# Patient Record
Sex: FEMALE | Race: WHITE | Employment: OTHER | URBAN - METROPOLITAN AREA
[De-identification: names, ages, dates, MRNs, and addresses within clinical notes are randomized per-mention and may not be internally consistent; named-entity substitution may affect disease eponyms.]

---

## 2019-03-20 NOTE — ED PROVIDER NOTES
Patient Seen in: [facility] AND [facility] Emergency Department    History   CC: nose injury  HPI: Chano Shayon 59year old female  who presents to the ER c/o nasal pain s/p injury today in which pt was cleaning her dogs paws and the dog turned her head quic Lung sounds clear bilaterally and wob unlabored, good aeration with equal, even expansion bilaterally   CV - RRR  Skin - no rashes or petechiae noted, pink warm and dry throughout, mmm, cap refill <2seconds  Neuro - A&O x4, 2+ reflexes bilat.  biceps, claudine Prescribed:  There are no discharge medications for this patient.

## 2019-09-01 NOTE — ED INITIAL ASSESSMENT (HPI)
Patient cut her right hand on some nails doing some work this morning. Has 2 small lacs and is on Plavix.  Tetanus UTD, bleeding controlled

## 2019-09-01 NOTE — ED PROVIDER NOTES
Patient Seen in: [facility] AND [facility] Emergency Department    History   Patient presents with:  Upper Extremity Injury (musculoskeletal)    Stated Complaint: right hand injury s/p fall    HPI    22-year-old female with history of seizures, here after a fall above.    Physical Exam     ED Triage Vitals [09/01/19 0919]   /54   Pulse 68   Resp 18   Temp 98 °F (36.7 °C)   Temp src    SpO2 100 %   O2 Device None (Room air)       Current:/54   Pulse 68   Temp 98 °F (36.7 °C)   Resp 18   SpO2 100% ED:  Xr Hand (min 3 Views), Right (cpt=73130)    Result Date: 9/1/2019  CONCLUSION:  1. No acute fracture or subluxation. 2. 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 Other milder degenerative changes in the hand.     Dictated by (CST): Paulette Gonsalez MD on [phone] [id number]  [phone]    Schedule an appointment as soon as possible for a visit in 2 days  As needed    We recommend that you schedule follow up care with a primary care provider within the next three months to obtain basic health screening including reasse

## 2019-09-25 NOTE — ED PROVIDER NOTES
Patient Seen in: [facility] AND [facility] Emergency Department      History   Patient presents with:  Bite Sting,Insect (integumentary)    Stated Complaint: bee sting    HPI    51-year-old female with history of hymenoptera allergy and severe reactions who stat rate, regular rhythm and intact distal pulses. Pulmonary/Chest: Effort normal. No respiratory distress. Clear and equal breath sounds noted. Abdominal: Soft. There is no tenderness. There is no guarding. Musculoskeletal: Normal range of motion.  No e diagnosis)    Disposition:  Discharge  9/25/2019  1:01 pm    Follow-up:  Smooth Chin MD  [address]  [phone]    Schedule an appointment as soon as possible for a visit in 1 day      We recommend that you s

## 2019-09-25 NOTE — ED NOTES
Patient presents to ER after being stung by a wasp to left wrist. Patient speaking in full sentences. No respiratory distress noted at this time. Patient states the itching is resolving. Managing oral secretions. Patient following commands appropriately.  A

## 2019-09-25 NOTE — ED NOTES
Patient presents to ER after wasp sting. Patient unsteady on feet when transferred  to cart. Patient c/o itching to back, stomach and left wrist where she was stung. Managing oral secretions. No respiratory distress noted.  Patient is able to speak in full

## 2019-09-25 NOTE — ED INITIAL ASSESSMENT (HPI)
Stung by bee to left wrist. Allergic to bee stings. Confused, fell walking in the door to the er. No obvious signs of injury noted. No loc. No head injury. State she had a seizure while she drover her car to er. Tongue swelling noted.

## 2021-10-10 NOTE — ED INITIAL ASSESSMENT (HPI)
Patient c/o bee sting to her right 4th finger with redness and swelling since yesterday. Also having left sided rash and was prescribed valtrex 9/28-feels the bee sting \"activated\" the shingles. Also having shortness of breath, with \"some angina. \" Vishal Faustin

## 2021-10-11 NOTE — ED PROVIDER NOTES
No chief complaint on file. Stated Complaint: bee sting, shingles pain, \"angina\" sob    HPI  Patient complains increased swelling and redness to r 4th digit after getting stung by bee yesterday. No cp or sob. Co itching.   No loss of sensation    Pas Course   Labs Reviewed - No data to display  EKG    Rate, intervals and axes as noted on EKG Report.   Rate: 62  Rhythm: Sinus Rhythm  Reading:Normal intervals, normal EKG             MDM                     Disposition and Plan     Clinical Impression:  Be

## 2021-11-07 NOTE — ED QUICK NOTES
Pt states thinks she had a breakthough seizure last night while in bed. States has swelling, bruising and pain to lt elbow. States she thinks she hit on either bedside table or head board. Denies hitting head.

## 2021-11-07 NOTE — ED INITIAL ASSESSMENT (HPI)
Patient complains of left elbow pain radiating down to wrist s/p fall from seizure 2 days ago. Denies head injury from fall, a/o x 4, answering questions appropriately and following commands.

## 2021-11-07 NOTE — ED PROVIDER NOTES
Patient Seen in: [facility] AND [facility] Emergency Department      History   Patient presents with:  Arm or Hand Injury    Stated Complaint: left arm injury    Subjective:   HPI    77year old female with h/o seizure d/o who has left elbow pain.  Pt states she atraumatic. Eyes:      Conjunctiva/sclera: Conjunctivae normal.      Pupils: Pupils are equal, round, and reactive to light. Cardiovascular:      Rate and Rhythm: Normal rate.    Pulmonary:      Effort: Pulmonary effort is normal. No respiratory distres screening including reassessment of your blood pressure.       Medications Prescribed:  Current Discharge Medication List

## 2022-01-22 PROBLEM — R07.9 CHEST PAIN WITH MODERATE RISK FOR CARDIAC ETIOLOGY: Status: ACTIVE | Noted: 2022-01-22

## 2022-01-22 NOTE — ED QUICK NOTES
Orders for admission, patient is aware of plan and ready to go upstairs. Any questions, please call ED DUANE Lopez at extension [phone].      Patient Covid vaccination status: Unvaccinated     COVID Test Ordered in ED: Rapid SARS-CoV-2 by PCR    COVID Suspicion a
no

## 2022-01-22 NOTE — ED INITIAL ASSESSMENT (HPI)
Sudden onset mid sternal CP starting as epigastric pain while shopping today. Pain now resolved. On Plavix s/p TIA in 1999.

## 2022-01-22 NOTE — ED PROVIDER NOTES
Patient Seen in: [facility] AND [facility] Emergency Department      History   Patient presents with:  Chest Pain    Stated Complaint: cp     Subjective:   HPI  66-year-old female with high cholesterol, seizure disorder, history of lung mass status post resectio moist.      Pharynx: Oropharynx is clear. Eyes:      Extraocular Movements: Extraocular movements intact. Pupils: Pupils are equal, round, and reactive to light. Cardiovascular:      Rate and Rhythm: Normal rate and regular rhythm.       Pulses: ---------                               -----------         ------                     CBC W/ DIFFERENTIAL[[id number]]                              Final result                 Please view results for these tests on the individual orders.    CBC W/ DIFFERE 1/22/2022 Unknown

## 2022-01-23 NOTE — H&P
[place] Patient Status:  Inpatient    1955 MRN [id number]   Location [facility] 3W/SW Attending Mayra Dotson MD   Hosp Day # 0 PCP None Pcp     Date:  2022  Date of Admi UNKNOWN  Tegretol [Carbamaze*    UNKNOWN  Tramadol                UNKNOWN  Trileptal [Oxcarbaz*    UNKNOWN  Penicillins             RASH  topiramate 200 MG Oral Tab, Take 200 mg by mouth 2 (two) times daily.  200mg in the morning and 400mg at night  clopi cardiopulmonary abnormality.     Dictated by (CST): Sejal Koenig MD on 1/22/2022 at 4:30 PM     Finalized by (CST): Sejal Koenig MD on 1/22/2022 at 4:32 PM          EKG 12 Lead    Result Date: 1/22/2022  ECG Report  Interpretation  -------------------------

## 2022-01-23 NOTE — PROGRESS NOTES
[facility]    Progress Note    Quinn Hughes Patient Status:  Inpatient    1955 MRN [id number]   Location [facility] 3W/SW Attending Ellyn Velasquez MD   Hosp Day # 1 PCP None Pcp       SUBJECTIVE:  Denies chest pain. PTT 31.4  --   --    INR 0.93  --   --    DDIMER  --  0.31  --    GLU 92  --  87       Imaging:    Meds:   potassium chloride (K-DUR M20) CR tab 40 mEq, 40 mEq, Oral, Q4H  clopidogrel (PLAVIX) tab 75 mg, 75 mg, Oral, Daily  clonazePAM (KLONOPIN) tab 0.5

## 2022-01-23 NOTE — PROGRESS NOTES
[facility] Pharmacy Note:  Renal Dose Adjustment    Jennifer Stokes has been prescribed famotidine (PEPCID) 40 mg orally every 24 hours.     Crcl is 47.7 ml/min    Calculated creatinine clearance is < 50 ml/min, therefore the dose of famotidine (Pepcid) has been

## 2022-01-24 NOTE — PLAN OF CARE
Stress test and 2D Echo today. Results pending. Home today if cleared by cardiology.      Problem: CARDIOVASCULAR - ADULT  Goal: Maintains optimal cardiac output and hemodynamic stability  Description: INTERVENTIONS:  - Monitor vital signs, rhythm, and tren

## 2022-01-24 NOTE — PLAN OF CARE
Pt is A&Ox4. Pt on RA, sats stable. Pt voids freely. Pt is up ad roe independently. When I went to give 0600 meds this AM, pt stated, \"I take my Vimpat, Plavix, and Topamax at 6 AM everyday\". Vimpat and Plavix were scheduled for 0600.  Topamax is schedule chest pain    Interventions:   - monitor for chest pain  - monitor vital signs  - prepare patient for testing  - See additional Care Plan goals for specific interventions  Outcome: Progressing     Problem: CARDIOVASCULAR - ADULT  Goal: Maintains optimal ca

## 2022-01-24 NOTE — CONSULTS
[facility]    Report of Consultation    [address] Patient Status:  Inpatient    1955 MRN [id number]   Location [facility] 3W/SW Attending Jacklyn Babin MD   [facility] Day # 2 PCP None Pcp     Date of Admission:   surgical history. Family History  No family history on file.     Social History  Social History    Tobacco Use      Smoking status: Never Smoker      Smokeless tobacco: Never Used    Alcohol use: Never    Drug use: Never          Current Medications:  mu UNKNOWN  Tegretol [Carbamaze*    UNKNOWN  Tramadol                UNKNOWN  Trileptal [Oxcarbaz*    UNKNOWN  Penicillins             RASH    Review of Systems:   12 point review of system was completed. Pertinent positive and negative findings per HPI. 01/22/2022    .0 01/22/2022    CREATSERUM 0.50 (L) 01/23/2022    BUN 8 01/23/2022     01/23/2022    K 4.2 01/23/2022     (H) 01/23/2022    CO2 22.0 01/23/2022    GLU 87 01/23/2022    CA 8.2 (L) 01/23/2022    PTT 31.4 01/22/2022    INR 0. may occur. When identified these errors have been corrected.  While every attempt is made to correct errors during dictation discrepancies may still exist.

## 2022-01-24 NOTE — PLAN OF CARE
Problem: Patient Centered Care  Goal: Patient preferences are identified and integrated in the patient's plan of care  Description: Interventions:  - What would you like us to know as we care for you?  Pt home alone  - Provide timely, complete, and accura

## 2022-01-24 NOTE — DIETARY NOTE
Brief Nutrition Note    Pt seen by RD due to low BMI of 16.57. Noted 13# (13%) wt loss in 2 months. Pt reports she normally eats 1 meal/day at home. Has only had 1 meal since admission (2 days) due to diarrhea? No diarrhea noted in chart.  Noted pt takes da

## 2022-01-24 NOTE — PROGRESS NOTES
[facility]    Progress Note    Joshua Nascimento Patient Status:  Inpatient    1955 MRN [id number]   Location [facility] 3W/SW Attending Nicyk Quevedo MD   Hosp Day # 2 PCP None Pcp       SUBJECTIVE:  Denies chest pain. Nightly  famotidine (PEPCID) tab 20 mg, 20 mg, Oral, Daily  lacosamide (VIMPAT) tab 200 mg, 200 mg, Oral, Daily  topiramate (TOPAMAX) tab, 400 mg, Oral, Nightly  topiramate (TOPAMAX) tab, 200 mg, Oral, Daily  lacosamide (VIMPAT) tab 100 mg, 100 mg, Oral, D

## 2022-01-25 NOTE — DISCHARGE SUMMARY
[facility]    PATIENT'S NAME: Sarbjit Godoy JIN   ATTENDING PHYSICIAN: Torito Ratliff MD   PATIENT ACCOUNT#:   [id number]    LOCATION:  [address] [place] [address] #:   [id number]       YOB: 1955  ADMISSION DATE:       01/22/2

## 2022-01-25 NOTE — PAYOR COMM NOTE
--------------  STATUS CHANGED TO OBSERVATION.   DC 1/24      ADMISSION REVIEW     Payor: [employer]ALE[employer] PPO  Subscriber #:  [id number]  Authorization Number: [id number]    Admit date: 1/22/22  Admit time:  6:59 PM     Patient Seen in: [facility] Normal rate and regular rhythm. Pulses:           Carotid pulses are 2+ on the right side and 2+ on the left side. Radial pulses are 2+ on the right side. Heart sounds: Normal heart sounds.    Pulmonary:      Effort: Pulmonary effort is norm will be obtained. Given her age, risk factors and lack of recent cardiac evaluation with EKG findings, she will be admitted to the hospital for further management. Case discussed with Dr. Meryle Singer for admission and Dr. Jose Powell for cardiology consultation. K 3.4 (L) 01/22/2022     01/22/2022    CO2 24.0 01/22/2022    GLU 92 01/22/2022    CA 9.0 01/22/2022    PTT 31.4 01/22/2022    INR 0.93 01/22/2022       Assessment/Plan:     Chest pain with moderate risk for cardiac etiology  We will trend troponin.

## 2022-05-05 ENCOUNTER — HOSPITAL ENCOUNTER (EMERGENCY)
Facility: HOSPITAL | Age: 67
Discharge: HOME | End: 2022-05-06
Attending: EMERGENCY MEDICINE
Payer: MEDICARE

## 2022-05-05 ENCOUNTER — APPOINTMENT (OUTPATIENT)
Dept: GENERAL RADIOLOGY | Facility: HOSPITAL | Age: 67
End: 2022-05-05
Attending: EMERGENCY MEDICINE
Payer: MEDICARE

## 2022-05-05 VITALS
DIASTOLIC BLOOD PRESSURE: 51 MMHG | OXYGEN SATURATION: 97 % | RESPIRATION RATE: 17 BRPM | WEIGHT: 91 LBS | TEMPERATURE: 98 F | SYSTOLIC BLOOD PRESSURE: 118 MMHG | BODY MASS INDEX: 17.18 KG/M2 | HEIGHT: 61 IN | HEART RATE: 60 BPM

## 2022-05-05 DIAGNOSIS — M54.9 BACK PAIN, UNSPECIFIED BACK LOCATION, UNSPECIFIED BACK PAIN LATERALITY, UNSPECIFIED CHRONICITY: Primary | ICD-10-CM

## 2022-05-05 PROCEDURE — 72100 X-RAY EXAM L-S SPINE 2/3 VWS: CPT | Performed by: EMERGENCY MEDICINE

## 2022-05-05 PROCEDURE — 96372 THER/PROPH/DIAG INJ SC/IM: CPT

## 2022-05-05 PROCEDURE — 99283 EMERGENCY DEPT VISIT LOW MDM: CPT

## 2022-05-05 RX ORDER — KETOROLAC TROMETHAMINE 30 MG/ML
30 INJECTION, SOLUTION INTRAMUSCULAR; INTRAVENOUS ONCE
Status: COMPLETED | OUTPATIENT
Start: 2022-05-05 | End: 2022-05-05

## 2022-05-05 RX ORDER — DIAZEPAM 5 MG/1
5 TABLET ORAL ONCE
Status: COMPLETED | OUTPATIENT
Start: 2022-05-05 | End: 2022-05-05

## 2022-05-06 ENCOUNTER — HOSPITAL ENCOUNTER (OUTPATIENT)
Dept: GENERAL RADIOLOGY | Facility: HOSPITAL | Age: 67
Discharge: HOME | End: 2022-05-06
Attending: INTERNAL MEDICINE
Payer: MEDICARE

## 2022-05-06 DIAGNOSIS — M25.551 RIGHT HIP PAIN: ICD-10-CM

## 2022-05-06 PROCEDURE — 73502 X-RAY EXAM HIP UNI 2-3 VIEWS: CPT | Performed by: INTERNAL MEDICINE

## 2022-05-06 NOTE — ED INITIAL ASSESSMENT (HPI)
Recently stopped plavix due to upcoming colonoscopy. Here for severe right leg/hip pain radiating down to foot.

## 2022-05-06 NOTE — ED QUICK NOTES
RN received call that patient  \" not feeling well\" after administered medication. Right arm redness noted. +uricaria noted to back/chest/and abdomen. Pt airway clear. Pt denies nausea or dyspnea. No respiratory distress noted. Lungs clear bilaterally. 98% on room air. Pt states allergy to benadryl. Refusing IV. MD aware. Pt is alert and oriented x4.

## 2022-05-06 NOTE — ED QUICK NOTES
Pt to ED with c/o atraumatic right lower back pain that radiates down right leg. Pt denies fever. Pt states recently held Plavix for scheduled Colonoscopy.

## 2022-05-09 NOTE — PROGRESS NOTES
VM received; pt requesting assistance w/scheduling apt (dc 05/05)    Pt looking for PT; transferred pt to the PT group  Closing encounter

## 2022-05-16 ENCOUNTER — HOSPITAL ENCOUNTER (OUTPATIENT)
Dept: MAMMOGRAPHY | Age: 67
Discharge: HOME | End: 2022-05-16
Attending: INTERNAL MEDICINE
Payer: MEDICARE

## 2022-05-16 DIAGNOSIS — Z12.31 ENCOUNTER FOR SCREENING MAMMOGRAM FOR MALIGNANT NEOPLASM OF BREAST: ICD-10-CM

## 2022-05-16 PROCEDURE — 77063 BREAST TOMOSYNTHESIS BI: CPT | Performed by: INTERNAL MEDICINE

## 2022-05-16 PROCEDURE — 77067 SCR MAMMO BI INCL CAD: CPT | Performed by: INTERNAL MEDICINE

## 2022-06-06 ENCOUNTER — TELEPHONE (OUTPATIENT)
Dept: PHYSICAL THERAPY | Facility: HOSPITAL | Age: 67
End: 2022-06-06

## 2022-06-07 ENCOUNTER — OFFICE VISIT (OUTPATIENT)
Dept: PHYSICAL THERAPY | Age: 67
End: 2022-06-07
Attending: INTERNAL MEDICINE
Payer: MEDICARE

## 2022-06-07 DIAGNOSIS — M79.606 LEG PAIN: ICD-10-CM

## 2022-06-07 DIAGNOSIS — M25.559 HIP PAIN: ICD-10-CM

## 2022-06-07 PROCEDURE — 97162 PT EVAL MOD COMPLEX 30 MIN: CPT

## 2022-06-10 ENCOUNTER — OFFICE VISIT (OUTPATIENT)
Dept: PHYSICAL THERAPY | Age: 67
End: 2022-06-10
Attending: INTERNAL MEDICINE
Payer: MEDICARE

## 2022-06-10 DIAGNOSIS — M25.559 HIP PAIN: ICD-10-CM

## 2022-06-10 DIAGNOSIS — M79.606 LEG PAIN: ICD-10-CM

## 2022-06-10 PROCEDURE — 97530 THERAPEUTIC ACTIVITIES: CPT

## 2022-06-14 ENCOUNTER — APPOINTMENT (OUTPATIENT)
Dept: PHYSICAL THERAPY | Age: 67
End: 2022-06-14
Attending: INTERNAL MEDICINE
Payer: MEDICARE

## 2022-06-17 ENCOUNTER — APPOINTMENT (OUTPATIENT)
Dept: PHYSICAL THERAPY | Age: 67
End: 2022-06-17
Attending: INTERNAL MEDICINE
Payer: MEDICARE

## 2022-06-17 ENCOUNTER — TELEPHONE (OUTPATIENT)
Dept: PHYSICAL THERAPY | Age: 67
End: 2022-06-17

## 2022-06-21 ENCOUNTER — APPOINTMENT (OUTPATIENT)
Dept: PHYSICAL THERAPY | Age: 67
End: 2022-06-21
Attending: INTERNAL MEDICINE
Payer: MEDICARE

## 2022-06-24 ENCOUNTER — APPOINTMENT (OUTPATIENT)
Dept: GENERAL RADIOLOGY | Facility: HOSPITAL | Age: 67
End: 2022-06-24
Attending: EMERGENCY MEDICINE
Payer: MEDICARE

## 2022-06-24 ENCOUNTER — HOSPITAL ENCOUNTER (EMERGENCY)
Facility: HOSPITAL | Age: 67
Discharge: HOME | End: 2022-06-24
Attending: EMERGENCY MEDICINE
Payer: MEDICARE

## 2022-06-24 ENCOUNTER — APPOINTMENT (OUTPATIENT)
Dept: PHYSICAL THERAPY | Age: 67
End: 2022-06-24
Attending: INTERNAL MEDICINE
Payer: MEDICARE

## 2022-06-24 VITALS
DIASTOLIC BLOOD PRESSURE: 53 MMHG | SYSTOLIC BLOOD PRESSURE: 114 MMHG | TEMPERATURE: 98 F | RESPIRATION RATE: 18 BRPM | OXYGEN SATURATION: 100 % | HEART RATE: 57 BPM
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
## 2022-06-24 DIAGNOSIS — S50.862A INSECT BITE OF LEFT FOREARM, INITIAL ENCOUNTER: Primary | ICD-10-CM

## 2022-06-24 LAB
ANION GAP SERPL CALC-SCNC: 6 MMOL/L (ref 0–18)
BASOPHILS # BLD AUTO: 0.04 X10(3) UL (ref 0–0.2)
BASOPHILS NFR BLD AUTO: 0.7 %
BUN BLD-MCNC: 10 MG/DL (ref 7–18)
BUN/CREAT SERPL: 16.9 (ref 10–20)
CALCIUM BLD-MCNC: 8.6 MG/DL (ref 8.5–10.1)
CHLORIDE SERPL-SCNC: 111 MMOL/L (ref 98–112)
CO2 SERPL-SCNC: 26 MMOL/L (ref 21–32)
CREAT BLD-MCNC: 0.59 MG/DL
DEPRECATED RDW RBC AUTO: 48 FL (ref 35.1–46.3)
EOSINOPHIL # BLD AUTO: 0.13 X10(3) UL (ref 0–0.7)
EOSINOPHIL NFR BLD AUTO: 2.3 %
ERYTHROCYTE [DISTWIDTH] IN BLOOD BY AUTOMATED COUNT: 12.8 % (ref 11–15)
GLUCOSE BLD-MCNC: 86 MG/DL (ref 70–99)
HCT VFR BLD AUTO: 41.5 %
HGB BLD-MCNC: 13.1 G/DL
IMM GRANULOCYTES # BLD AUTO: 0.01 X10(3) UL (ref 0–1)
IMM GRANULOCYTES NFR BLD: 0.2 %
LYMPHOCYTES # BLD AUTO: 1.83 X10(3) UL (ref 1–4)
LYMPHOCYTES NFR BLD AUTO: 32.7 %
MCH RBC QN AUTO: 31.7 PG (ref 26–34)
MCHC RBC AUTO-ENTMCNC: 31.6 G/DL (ref 31–37)
MCV RBC AUTO: 100.5 FL
MONOCYTES # BLD AUTO: 0.39 X10(3) UL (ref 0.1–1)
MONOCYTES NFR BLD AUTO: 7 %
NEUTROPHILS # BLD AUTO: 3.2 X10 (3) UL (ref 1.5–7.7)
NEUTROPHILS # BLD AUTO: 3.2 X10(3) UL (ref 1.5–7.7)
NEUTROPHILS NFR BLD AUTO: 57.1 %
OSMOLALITY SERPL CALC.SUM OF ELEC: 294 MOSM/KG (ref 275–295)
PLATELET # BLD AUTO: 310 10(3)UL (ref 150–450)
POTASSIUM SERPL-SCNC: 3.8 MMOL/L (ref 3.5–5.1)
RBC # BLD AUTO: 4.13 X10(6)UL
SODIUM SERPL-SCNC: 143 MMOL/L (ref 136–145)
TROPONIN I HIGH SENSITIVITY: 17 NG/L
WBC # BLD AUTO: 5.6 X10(3) UL (ref 4–11)

## 2022-06-24 PROCEDURE — 84484 ASSAY OF TROPONIN QUANT: CPT | Performed by: EMERGENCY MEDICINE

## 2022-06-24 PROCEDURE — 71045 X-RAY EXAM CHEST 1 VIEW: CPT | Performed by: EMERGENCY MEDICINE

## 2022-06-24 PROCEDURE — 99284 EMERGENCY DEPT VISIT MOD MDM: CPT

## 2022-06-24 PROCEDURE — 85025 COMPLETE CBC W/AUTO DIFF WBC: CPT | Performed by: EMERGENCY MEDICINE

## 2022-06-24 PROCEDURE — 80048 BASIC METABOLIC PNL TOTAL CA: CPT | Performed by: EMERGENCY MEDICINE

## 2022-06-24 PROCEDURE — 93005 ELECTROCARDIOGRAM TRACING: CPT

## 2022-06-24 PROCEDURE — 36415 COLL VENOUS BLD VENIPUNCTURE: CPT

## 2022-06-24 PROCEDURE — 93010 ELECTROCARDIOGRAM REPORT: CPT | Performed by: EMERGENCY MEDICINE

## 2022-06-24 NOTE — ED INITIAL ASSESSMENT (HPI)
Bruising noted to Lt forearm. Pt feels like she may have been bit by a spider. States she is allergic to spider bites and she became nauseated after eating today.

## 2022-06-27 ENCOUNTER — APPOINTMENT (OUTPATIENT)
Dept: PHYSICAL THERAPY | Age: 67
End: 2022-06-27
Attending: INTERNAL MEDICINE
Payer: MEDICARE

## 2022-06-29 ENCOUNTER — APPOINTMENT (OUTPATIENT)
Dept: PHYSICAL THERAPY | Age: 67
End: 2022-06-29
Attending: INTERNAL MEDICINE
Payer: MEDICARE

## 2022-12-14 ENCOUNTER — LAB ENCOUNTER (OUTPATIENT)
Dept: LAB | Facility: HOSPITAL | Age: 67
End: 2022-12-14
Attending: INTERNAL MEDICINE
Payer: MEDICARE

## 2022-12-14 DIAGNOSIS — R35.89 POLYURIA: ICD-10-CM

## 2022-12-14 DIAGNOSIS — E55.9 AVITAMINOSIS D: ICD-10-CM

## 2022-12-14 DIAGNOSIS — E78.00 PURE HYPERCHOLESTEROLEMIA: Primary | ICD-10-CM

## 2022-12-14 LAB
ALBUMIN SERPL-MCNC: 3.6 G/DL (ref 3.4–5)
ALBUMIN/GLOB SERPL: 1.1 {RATIO} (ref 1–2)
ALP LIVER SERPL-CCNC: 90 U/L
ALT SERPL-CCNC: 28 U/L
ANION GAP SERPL CALC-SCNC: 3 MMOL/L (ref 0–18)
AST SERPL-CCNC: 18 U/L (ref 15–37)
BASOPHILS # BLD AUTO: 0.03 X10(3) UL (ref 0–0.2)
BASOPHILS NFR BLD AUTO: 0.7 %
BILIRUB SERPL-MCNC: 0.3 MG/DL (ref 0.1–2)
BILIRUB UR QL: NEGATIVE
BUN BLD-MCNC: 7 MG/DL (ref 7–18)
BUN/CREAT SERPL: 11.7 (ref 10–20)
CALCIUM BLD-MCNC: 8.8 MG/DL (ref 8.5–10.1)
CHLORIDE SERPL-SCNC: 112 MMOL/L (ref 98–112)
CHOLEST SERPL-MCNC: 169 MG/DL (ref ?–200)
CLARITY UR: CLEAR
CO2 SERPL-SCNC: 27 MMOL/L (ref 21–32)
COLOR UR: YELLOW
CREAT BLD-MCNC: 0.6 MG/DL
DEPRECATED RDW RBC AUTO: 46 FL (ref 35.1–46.3)
EOSINOPHIL # BLD AUTO: 0.11 X10(3) UL (ref 0–0.7)
EOSINOPHIL NFR BLD AUTO: 2.7 %
ERYTHROCYTE [DISTWIDTH] IN BLOOD BY AUTOMATED COUNT: 12.8 % (ref 11–15)
FASTING PATIENT LIPID ANSWER: YES
FASTING STATUS PATIENT QL REPORTED: YES
GFR SERPLBLD BASED ON 1.73 SQ M-ARVRAT: 98 ML/MIN/1.73M2 (ref 60–?)
GLOBULIN PLAS-MCNC: 3.3 G/DL (ref 2.8–4.4)
GLUCOSE BLD-MCNC: 93 MG/DL (ref 70–99)
GLUCOSE UR-MCNC: NEGATIVE MG/DL
HCT VFR BLD AUTO: 43.8 %
HDLC SERPL-MCNC: 73 MG/DL (ref 40–59)
HGB BLD-MCNC: 14.3 G/DL
HGB UR QL STRIP.AUTO: NEGATIVE
IMM GRANULOCYTES # BLD AUTO: 0.01 X10(3) UL (ref 0–1)
IMM GRANULOCYTES NFR BLD: 0.2 %
KETONES UR-MCNC: NEGATIVE MG/DL
LDLC SERPL CALC-MCNC: 85 MG/DL (ref ?–100)
LEUKOCYTE ESTERASE UR QL STRIP.AUTO: NEGATIVE
LYMPHOCYTES # BLD AUTO: 1.18 X10(3) UL (ref 1–4)
LYMPHOCYTES NFR BLD AUTO: 28.9 %
MCH RBC QN AUTO: 31.9 PG (ref 26–34)
MCHC RBC AUTO-ENTMCNC: 32.6 G/DL (ref 31–37)
MCV RBC AUTO: 97.8 FL
MONOCYTES # BLD AUTO: 0.22 X10(3) UL (ref 0.1–1)
MONOCYTES NFR BLD AUTO: 5.4 %
NEUTROPHILS # BLD AUTO: 2.54 X10 (3) UL (ref 1.5–7.7)
NEUTROPHILS # BLD AUTO: 2.54 X10(3) UL (ref 1.5–7.7)
NEUTROPHILS NFR BLD AUTO: 62.1 %
NITRITE UR QL STRIP.AUTO: NEGATIVE
NONHDLC SERPL-MCNC: 96 MG/DL (ref ?–130)
OSMOLALITY SERPL CALC.SUM OF ELEC: 292 MOSM/KG (ref 275–295)
PH UR: 7 [PH] (ref 5–8)
PLATELET # BLD AUTO: 279 10(3)UL (ref 150–450)
POTASSIUM SERPL-SCNC: 4.1 MMOL/L (ref 3.5–5.1)
PROT SERPL-MCNC: 6.9 G/DL (ref 6.4–8.2)
PROT UR-MCNC: NEGATIVE MG/DL
RBC # BLD AUTO: 4.48 X10(6)UL
SODIUM SERPL-SCNC: 142 MMOL/L (ref 136–145)
SP GR UR STRIP: 1.01 (ref 1–1.03)
TRIGL SERPL-MCNC: 53 MG/DL (ref 30–149)
TSI SER-ACNC: 2.84 MIU/ML (ref 0.36–3.74)
UROBILINOGEN UR STRIP-ACNC: 0.2
VIT D+METAB SERPL-MCNC: 8.1 NG/ML (ref 30–100)
VLDLC SERPL CALC-MCNC: 8 MG/DL (ref 0–30)
WBC # BLD AUTO: 4.1 X10(3) UL (ref 4–11)

## 2022-12-14 PROCEDURE — 36415 COLL VENOUS BLD VENIPUNCTURE: CPT

## 2022-12-14 PROCEDURE — 84443 ASSAY THYROID STIM HORMONE: CPT

## 2022-12-14 PROCEDURE — 85025 COMPLETE CBC W/AUTO DIFF WBC: CPT

## 2022-12-14 PROCEDURE — 80061 LIPID PANEL: CPT

## 2022-12-14 PROCEDURE — 81003 URINALYSIS AUTO W/O SCOPE: CPT

## 2022-12-14 PROCEDURE — 80053 COMPREHEN METABOLIC PANEL: CPT

## 2022-12-14 PROCEDURE — 82306 VITAMIN D 25 HYDROXY: CPT

## 2023-03-04 NOTE — ED INITIAL ASSESSMENT (HPI)
Pt presents for c/o pain behind right knee that awoke her from sleep in the middle of the night. Pt denies trauma. Pt also reports bruise to right thigh and difficulty walking. Pt denies hx of blood clots, reports daily plavix use.

## 2023-03-04 NOTE — ED QUICK NOTES
Patient to ED for complaints of right sided posterior knee/popliteal pain xs 0315 this am. Pt states she was sleeping on her back and turned to side, hitting left knee onto right knee and 10/10 sudden pain. Patient states she attempted to bear weight and pain was excrutiating. Since morning, pain has increased. Pedal pulse palpable. Bilateral lower feet cool to touch and slightly purple in color-improvement with elevating legs onto cart. Pt also reports large 'corie/bruise' on right thigh this morning-pt agrees no visible corie at this time. Has varicose veins that are baseline for patient.

## 2024-03-08 NOTE — ED INITIAL ASSESSMENT (HPI)
Pt bitten to L upper arm by her own dog on Tuesday. Was told to come into ER d/t redness to area. CMS in tact distally other than c/o occasional elbow falling asleep.   Sts both she and her dog are UTD w/ shots. Last tdap on 2017  No further complaints. A/ox4, respirations unlabored, speech full/clear, gait steady, no acute distress noted.

## 2024-03-08 NOTE — ED PROVIDER NOTES
Patient Seen in: [facility] Emergency Department      History     Chief Complaint   Patient presents with    Bite     Stated Complaint: Bite    Subjective:   HPI    Patient is a 69-year-old female who states that she was bitten by her own dog 3 days ago dog accidentally was trying to get his ball and bit her left upper arm.  Patient's dog is up-to-date patient's last tetanus shot was less than 10 years ago.  She has noticed some increasing redness around the injury on her left biceps region.  She has been using Neosporin.    Objective:   Past Medical History:   Diagnosis Date    S/P lobectomy of brain     Seizure disorder (HCC)               Past Surgical History:   Procedure Laterality Date    HYSTERECTOMY                  Social History     Socioeconomic History    Marital status:    Tobacco Use    Smoking status: Never    Smokeless tobacco: Never   Vaping Use    Vaping Use: Never used   Substance and Sexual Activity    Alcohol use: Never    Drug use: Never              Review of Systems    Positive for stated complaint: Bite  Other systems are as noted in HPI.  Constitutional and vital signs reviewed.      All other systems reviewed and negative except as noted above.    Physical Exam     ED Triage Vitals [03/08/24 1540]   /78   Pulse 68   Resp 16   Temp 97.8 °F (36.6 °C)   Temp src    SpO2 99 %   O2 Device None (Room air)       Current:/78   Pulse 68   Temp 97.8 °F (36.6 °C)   Resp 16   SpO2 99%         Physical Exam    Patient awake alert and afebrile well-appearing focused Ty on her left upper arm reveals a injury to her left upper arm on the anterior surface.  There is superficial wound.  There is some surrounding erythema.  There is some surrounding bruising as well.  There is no foreign body.  Distal capillary refill sensation motor function is intact.  Painless range of motion of the elbow   ED Course   Labs Reviewed - No data to display                   MDM      Use of  independent historian:     I personally reviewed and interpreted the images :     No results found.    Vitals:    03/08/24 1540   BP: 129/78   Pulse: 68   Resp: 16   Temp: 97.8 °F (36.6 °C)   SpO2: 99%     *I personally reviewed and interpreted all ED vitals.    Pulse Ox: 99%, Room air, Normal       Differential Diagnosis/ Diagnostic Considerations: Dog bite left arm with signs of mild early infection.  No evidence of foreign body no evidence of muscle or tendon injury.  No signs of systemic infection.    Medical Record Review: I personally reviewed available prior medical records for any recent pertinent discharge summaries, testing, and procedures and reviewed those reports and found walk-in clinic visit from today noted..    Complicating Factors: The patient already has history of seizure disorder.  Which contribute to the complexity of this ED evaluation.    Social determinants of health:    Prescription drug management:      Shared Decision Making:    ED Course: I did discuss her multiple antibiotic allergies and most of them are not allergies but unwanted side effects.  In reviewing her old records she has tolerated cephalosporins in the past without problems.  Will prescribe Duricef and discharged home.    Discussion of management with other healthcare providers:    Condition upon leaving the department: Stable  .my                                   Medical Decision Making      Disposition and Plan     Clinical Impression:  1. Wound infection    2. Dog bite of left upper extremity, initial encounter         Disposition:  There is no disposition on file for this visit.  There is no disposition time on file for this visit.    Follow-up:  Jose Armando Mcknight MD  [address] [facility]  [address] [place] [id number]  [phone]    Follow up in 3 day(s)      We recommend that you schedule follow up care with a primary care provider within the next three months to obtain basic health screening including reassessment of your  blood pressure.      Medications Prescribed:  Current Discharge Medication List        START taking these medications    Details   cefadroxil 500 MG Oral Cap Take 1 capsule (500 mg total) by mouth 2 (two) times daily for 7 days.  Qty: 14 capsule, Refills: 0

## 2024-03-08 NOTE — PROGRESS NOTES
CHIEF COMPLAINT:     Chief Complaint   Patient presents with    Bite     Sx Tuesday - L arm dog bite above elbow  OTC Neosporin and alcohol       HPI:   Mara Davis is a 69 year old female who presents with complaints of dog bite to left arm.  This occurred 3 days ago.  This was pt's own [place] guidry who is up to date on shots.  The dog had gotten excited when playing and then saw a squirrel, accidentally biting owner.  Pt is on Plavix thus reports took a while to control bleeding but eventual ceased with bandaging.  She has gradually seen some increasing redness, scant yellowish discharge.  Has been applying neosporin and alcohol without much relief.  There is tenderness with ROM of arm.  No numbness/tingling.  Denies fever/chills/systemic symptoms.  Denies streaking or swollen lymph nodes.    Current Outpatient Medications   Medication Sig Dispense Refill    atorvastatin 80 MG Oral Tab Take 1 tablet every day by oral route at bedtime.      cholecalciferol 1.25 MG (71788 UT) Oral Cap Take 1 capsule every week by oral route.      hydrOXYzine Pamoate (VISTARIL) 50 MG Oral Cap Take by mouth.      lacosamide 50 MG Oral Tab Take by mouth 2 (two) times daily.      topiramate 200 MG Oral Tab Take 1 tablet (200 mg total) by mouth 2 (two) times daily. 200mg in the morning and 400mg at night      clonazePAM 0.5 MG Oral Tab Take 1 tablet (0.5 mg total) by mouth nightly. At bedtime for seizures        Past Medical History:   Diagnosis Date    S/P lobectomy of brain     Seizure disorder (HCC)       Social History:  Social History     Socioeconomic History    Marital status:    Tobacco Use    Smoking status: Never    Smokeless tobacco: Never   Vaping Use    Vaping Use: Never used   Substance and Sexual Activity    Alcohol use: Never    Drug use: Never        REVIEW OF SYSTEMS:   GENERAL: Denies fever, chills,weight change, decreased appetite  SKIN: See HPI  EYES: Denies blurred vision or double vision  HENT:  Denies congestion, rhinorrhea, sore throat or ear pain  CHEST: Denies chest pain, or palpitations  LUNGS: Denies shortness of breath, cough, or wheezing  GI: Denies abdominal pain, N/V/C/D.   MUSCULOSKELETAL: no arthralgia or swollen joints  LYMPH:  Denies lymphadenopathy  NEURO: Denies headaches or lightheadedness      EXAM:   /62   Pulse 61   Temp 97.8 °F (36.6 °C)   Resp 16   Ht 4' 11\" (1.499 m)   Wt 94 lb (42.6 kg)   SpO2 98%   BMI 18.99 kg/m²   GENERAL: well developed, well nourished,in no apparent distress  SKIN: no rashes,no suspicious lesions  HEAD: atraumatic, normocephalic  NECK: supple, non-tender  LUNGS: clear to auscultation bilaterally, no wheezes or rhonchi. Breathing is non labored.  CARDIO: RRR without murmur  EXTREMITIES: +Laceration/bite wound to left lower bicep, near elbow.  Approx 2 inches.  +Surrounding erythema with scabbing and scant yellowish discharge.  +Tenderness to affected area.  No cyanosis, clubbing or edema.  Sensation and radial/brachial pulses strong and intact. Able to maintain full ROM of joint.  LYMPH:  No lymphadenopathy.          ASSESSMENT AND PLAN:     ASSESSMENT:  Encounter Diagnosis   Name Primary?    Dog bite of arm, left, initial encounter Yes       PLAN:  - Dog bite with infection, injury occurred 3 days ago.  - Given pt's amoxicillin allergy (per pt- reaction to amox is seizures), will avoid Augmentin.  Per uptodate, dual therapy recommended for anaerobe coverage.  Advised Tx with dual therapy of doxycycline and metronidazole.  Discussed case with collaborative physician, Dr. Sherley Toro, who agrees with this plan.  However, pt with extensive medication allergies and now recalls she has severe vomiting with metronidazole.  Offered to give with zofran if needed but pt hesitant and declines.  Only alternative listed to metronidazole per uptodate would be clindamycin but pt reports complex GI history and is hesitant to take clindamycin due to  possibility of diarrhea.  Pt prefers IV antibiotic.  Discussed limitations of WIC and pt wishes to proceed to ED for further Tx.  - Pt advised/aware of need for Tdap booster as it has been >5 years but prefers to just get at ED.  - Pt declines EMS or having someone else drive her.  - The patient indicates understanding of these issues and agrees to the plan.  - Pt leaves [facility] for ER in no distress.

## 2024-07-19 NOTE — ED INITIAL ASSESSMENT (HPI)
Patient arrives ambulatory through triage with c/o of diffuse rash/hives on body that started this last night.

## 2024-07-19 NOTE — DISCHARGE INSTRUCTIONS
Take Zyrtec daily  Apply hydrocortisone cream  Stay cool  Return if increased rash or itching or trouble breathing  Wash clothes and sheets in hot water

## 2024-07-19 NOTE — ED PROVIDER NOTES
Patient Seen in: [facility] Emergency Department      History     Chief Complaint   Patient presents with    Rash    Well Adult     Stated Complaint: Hives    Subjective:   HPI    The patient is 69-year-old female who presents with itching to her trunk arms and legs that started 2 nights ago while sleeping.  She had been outside with the dog in the grass.  No new ingestions medications or contacts.  No facial itching.  No lip or tongue swelling.  No difficulty breathing or swallowing    Objective:   Past Medical History:    S/P lobectomy of brain    Seizure disorder (HCC)              Past Surgical History:   Procedure Laterality Date    Hysterectomy                  Social History     Socioeconomic History    Marital status:    Tobacco Use    Smoking status: Never    Smokeless tobacco: Never   Vaping Use    Vaping status: Never Used   Substance and Sexual Activity    Alcohol use: Never    Drug use: Never              Review of Systems    Positive for stated Chief Complaint: Rash and Well Adult    Other systems are as noted in HPI.  Constitutional and vital signs reviewed.      All other systems reviewed and negative except as noted above.    Physical Exam     ED Triage Vitals [07/18/24 1955]   /83   Pulse 76   Resp 20   Temp 98.5 °F (36.9 °C)   Temp src    SpO2 98 %   O2 Device None (Room air)       Current Vitals:   Vital Signs  BP: 151/83  Pulse: 76  Resp: 20  Temp: 98.5 °F (36.9 °C)    Oxygen Therapy  SpO2: 98 %  O2 Device: None (Room air)            Physical Exam  Vitals and nursing note reviewed.   Constitutional:       General: She is not in acute distress.     Appearance: Normal appearance. She is well-developed. She is not ill-appearing.   HENT:      Head: Normocephalic.      Mouth/Throat:      Mouth: Mucous membranes are moist.   Eyes:      Conjunctiva/sclera: Conjunctivae normal.   Neck:      Vascular: No JVD.   Cardiovascular:      Rate and Rhythm: Normal rate and regular rhythm.       Heart sounds: Normal heart sounds. No murmur heard.  Pulmonary:      Effort: Pulmonary effort is normal.      Breath sounds: Normal breath sounds.   Abdominal:      Palpations: There is no mass.   Musculoskeletal:         General: Normal range of motion.      Cervical back: Normal range of motion.   Skin:     General: Skin is warm and dry.      Capillary Refill: Capillary refill takes less than 2 seconds.      Findings: Rash (Diffuse urticaria to trunk arms legs and between toes and fingers) present.   Neurological:      General: No focal deficit present.      Mental Status: She is alert and oriented to person, place, and time.      Deep Tendon Reflexes: Reflexes are normal and symmetric.   Psychiatric:         Judgment: Judgment normal.         Differential diagnosis includes allergic urticaria, insect bites    ED Course   Labs Reviewed - No data to display                   MDM                                         Medical Decision Making  Patient with urticaria concerning for bedbugs or scabies patient adamantly denies this as a possibility  Refuses Benadryl and prednisone  Will agreed to Zyrtec and hydrocortisone  Recommend washing sheets and close  Return if symptoms worsen  Vital signs stable prior to discharge    Problems Addressed:  Urticaria: acute illness or injury    Risk  OTC drugs.        Disposition and Plan     Clinical Impression:  1. Urticaria         Disposition:  Discharge  7/18/2024  9:13 pm    Follow-up:  Jose Armando Mcknigth MD  [address] [facility]  [address] [place] [address] [id number] [phone]    Schedule an appointment as soon as possible for a visit      We recommend that you schedule follow up care with a primary care provider within the next three months to obtain basic health screening including reassessment of your blood pressure.      Medications Prescribed:  Current Discharge Medication List

## 2024-08-24 NOTE — PROGRESS NOTES
CHIEF COMPLAINT:     Chief Complaint   Patient presents with    Rash     Sx Thursday - Patient was gardening and some plants got in her eye and that night, L eye and undereye became red, inflamed, and itchy  Sx Friday - L eye double vision, blurriness, loss of vision (~2 mins), noticed red itchy bites all over body  OTC Visine         HPI:    Mara Davis is a 69 year old female who presents for evaluation of a rash.  Per patient rash started in the past 3  days. Rash has been worse since onset.  Patient reports similar rash in the past. The rash is characterized by redness to left eye and face. Patient has treated rash with visine eye drops without relief.  Associated symptoms include: itching  Exposure: gardening. Reports plants may have got in her eye Thursday night. She has also had bug/insect bites to her arms, chest and back for about 2 weeks. Reports moderate itching. No home treatments but she is using a back scratcher.     Current Outpatient Medications   Medication Sig Dispense Refill    lacosamide 100 MG Oral Tab TAKE 1 TABLET BY MOUTH IN THE MORNING AND 2 TABLETS IN THE EVENING      clopidogrel 75 MG Oral Tab Take 1 tablet (75 mg total) by mouth daily.      atorvastatin 80 MG Oral Tab Take 1 tablet every day by oral route at bedtime.      topiramate 200 MG Oral Tab Take 1 tablet (200 mg total) by mouth 2 (two) times daily. 200mg in the morning and 400mg at night      clonazePAM 0.5 MG Oral Tab Take 1 tablet (0.5 mg total) by mouth nightly. At bedtime for seizures      hydrOXYzine Pamoate (VISTARIL) 50 MG Oral Cap Take by mouth. (Patient not taking: Reported on 8/24/2024)        Past Medical History:    S/P lobectomy of brain    Seizure disorder (HCC)      Past Surgical History:   Procedure Laterality Date    Hysterectomy        Family History   Problem Relation Age of Onset    Ovarian Cancer Mother 65    Breast Cancer Mother 84    Breast Cancer Maternal Aunt         UNK       Social History      Socioeconomic History    Marital status:    Tobacco Use    Smoking status: Never    Smokeless tobacco: Never   Vaping Use    Vaping status: Never Used   Substance and Sexual Activity    Alcohol use: Never    Drug use: Never         REVIEW OF SYSTEMS:   GENERAL: feels well otherwise  SKIN: Per HPI.   HEENT: Denies rhinorrhea, edema of the lips or swelling of throat.  CARDIOVASCULAR: Denies chest pains or palpitations.  LUNGS: Denies shortness of breath with exertion or rest. No cough or wheezing.  LYMPH: Denies enlargement of the lymph nodes.    EXAM:   /56   Pulse 67   Temp 97.8 °F (36.6 °C)   Resp 16   Ht 4' 11\" (1.499 m)   Wt 94 lb (42.6 kg)   SpO2 98%   BMI 18.99 kg/m²   GENERAL: well developed, well nourished,in no apparent distress  SKIN: Left upper and lower eyelid/face erythematous, blanches, non tender. Insect bites noted to abdomen and back. See below pics.               EYES: PERRLA, EOMI, conjunctiva are clear. No foreign body noted.   HENT: Head atraumatic, normocephalic. TM's WNL bilaterally. Normal external nose. Nasal mucosa pink without edema.  Oropharynx moist without lesions. No erythema of the throat.  LUNGS: Clear to auscultation bilaterally.  No wheezing, rhonchi, or rales.  No diminished breath sounds. No increased work of breathing.   CARDIO: RRR without murmur  LYMPH: No lymphadenopathy.         ASSESSMENT AND PLAN:   Mara Davis is a 69 year old female who presents for evaluation of a rash. Findings are consistent with:    ASSESSMENT:  Encounter Diagnoses   Name Primary?    Insect bite of head with local reaction, initial encounter Yes    Multiple insect bites        PLAN:   Discussed most likely oak itch mites local reaction to face. Instructed to stop visine eye drops  Zyrtec and Hydrocortisone. Ice pack. Avoid scratching. Patient has multiple allergies and unable to take Benadryl or Prednisone.  Comfort measures as described in Patient Instructions.  Skin  care discussed with patient.   The patient is asked to see PCP in 3 days if sx's are not improving.  ED if symptoms worsening.   The patient indicates understanding of these issues and agrees to the plan.

## 2024-12-14 NOTE — ED QUICK NOTES
Orders for admission, patient is aware of plan and ready to go upstairs. Any questions, please call ED RN Priya at extension [phone].     Patient Covid vaccination status: Unvaccinated     COVID Test Ordered in ED: None    COVID Suspicion at Admission: N/A    Running Infusions:  None    Mental Status/LOC at time of transport: axox4    Other pertinent information:   CIWA score: N/A   NIH score:  N/A

## 2024-12-14 NOTE — DISCHARGE INSTRUCTIONS
Follow-up with pcp in 1-2 weeks  Talk to Dr. Mcknight about your poor appetite, consider appetite stimulants

## 2024-12-14 NOTE — ED INITIAL ASSESSMENT (HPI)
PATIENT WAS SENT BY MD TO R/O RIGHT LEG DVT. PATIENT STATES HAVING HARD TIME WALKING SINCE YESTERDAY. PATIENT STATES WAKING THIS AM WITH CHEST PRESSURE & TROUBLE BREATHING THAT LASTED COUPLE HOURS. PATIENT IS ON PLAVIX. NO PREVIOUS DVTs.

## 2024-12-14 NOTE — ED PROVIDER NOTES
Patient Seen in: [facility] Emergency Department      History     Chief Complaint   Patient presents with    Deep Vein Thrombosis     Stated Complaint: Blood Clot PCP Ref    Subjective:   69-year-old female with a history of seizure who takes Plavix for possible TIA and a statin here upon recommendation she is telling me from her physician.  The reason he had her come in is she mentioned to him that at a Jose to 4 AM this morning she woke up with chest tightness and difficulty breathing described as a tight feeling across her chest.  It lasted 30 minutes and it has been fine ever since.  However she did not get a hold of them till late this afternoon and he told her to go to the ER.  In addition she has been having some right lateral knee pain since yesterday.  Increases to ambulate or touch certain places.  There is a contusion on her right lateral leg but she does not know how it got there.  No distal weakness or numbness.  No slurred speech or vision change.  No vertigo.  No confusion.  She never gets exertional chest pain.  Denies tobacco.  No history of heart disease.  Patient's give me a slightly different story than what I read from the triage note.              Objective:     Past Medical History:    S/P lobectomy of brain    Seizure disorder (HCC)              Past Surgical History:   Procedure Laterality Date    Hysterectomy                  Social History     Socioeconomic History    Marital status:    Tobacco Use    Smoking status: Never    Smokeless tobacco: Never   Vaping Use    Vaping status: Never Used   Substance and Sexual Activity    Alcohol use: Never    Drug use: Never                  Physical Exam     ED Triage Vitals   BP 12/13/24 1809 127/77   Pulse 12/13/24 1809 72   Resp 12/13/24 1809 20   Temp 12/13/24 1812 98.9 °F (37.2 °C)   Temp src 12/13/24 1812 Temporal   SpO2 12/13/24 1809 99 %   O2 Device 12/13/24 1809 None (Room air)       Current Vitals:   Vital Signs  BP:  152/75  Pulse: 52  Resp: 18  Temp: 98.9 °F (37.2 °C)  Temp src: Temporal    Oxygen Therapy  SpO2: 100 %  O2 Device: None (Room air)        Physical Exam  Constitutional:       Comments: Ambulated down the weston without any discomfort   HENT:      Head: Normocephalic and atraumatic.      Right Ear: External ear normal.      Left Ear: External ear normal.      Nose: Nose normal.      Mouth/Throat:      Mouth: Mucous membranes are moist.   Eyes:      Extraocular Movements: Extraocular movements intact.      Pupils: Pupils are equal, round, and reactive to light.   Cardiovascular:      Rate and Rhythm: Normal rate and regular rhythm.      Pulses: Normal pulses.      Heart sounds: Normal heart sounds.   Pulmonary:      Effort: Pulmonary effort is normal.      Breath sounds: Normal breath sounds.   Abdominal:      Palpations: Abdomen is soft.      Tenderness: There is no abdominal tenderness.   Musculoskeletal:         General: Normal range of motion.      Cervical back: Normal range of motion and neck supple.      Comments: Right knee: No effusion warmth or redness.  Able to extend and flex against resistance.  No laxity.  There is some lateral joint space tenderness.  She does have some chronic spider veins noted.  There is a contusion over the lateral calf which is small.  Distally neurovascular intact   Skin:     General: Skin is warm.   Neurological:      General: No focal deficit present.      Mental Status: She is alert and oriented to person, place, and time.      Sensory: No sensory deficit.      Motor: No weakness.             ED Course     Labs Reviewed   COMP METABOLIC PANEL (14) - Abnormal; Notable for the following components:       Result Value    BUN 8 (*)     All other components within normal limits   TROPONIN I HIGH SENSITIVITY - Normal   D-DIMER - Normal   CBC WITH DIFFERENTIAL WITH PLATELET   RAINBOW DRAW LAVENDER   RAINBOW DRAW LIGHT GREEN   RAINBOW DRAW BLUE     EKG    Rate, intervals and axes as  noted on EKG Report.  Rate: 57  Rhythm: Sinus Rhythm  Reading: ST abnormalities inferiorly as well as V3 through V6.  No ST elevation.  Low voltage, sinus bradycardia.  QTc 401.  Abnormal EKG.  I was able to find an EKG from 2022.  She did have the inferior ST inversions as well as V3 through V5 showed some nonspecific ST abnormalities.  No significant change overall in the last 2-1/2 years.         ED Course as of 12/13/24 2207  ------------------------------------------------------------  Time: 12/13 1952  Comment: Labs independently turbid by me.  CBC normal, CMP normal, D-dimer in the normal range, first troponin normal.  ------------------------------------------------------------  Time: 12/13 2118  Comment: Patient doing well.  Time for her second troponin.  Chest x-ray shows no acute process.  She does have postoperative changes in the left lung.  ------------------------------------------------------------  Time: 12/13 2150  Comment: Repeat EKG read by myself performed at 2140.  Sinus bradycardia rate of 51.  The same ST and T wave abnormalities are still present and unchanged.  Abnormal EKG.  ------------------------------------------------------------  Time: 12/13 2200  Comment: Repeat troponin independently turbid by me is normal              MDM      XR CHEST AP PORTABLE  (CPT=71045)    Result Date: 12/13/2024  CONCLUSION:  Stable chest demonstrating hyperexpanded lungs and postoperative changes of the left lung without radiographically evident acute intrathoracic process.    Dictated by (CST): Colt Pearce MD on 12/13/2024 at 9:03 PM     Finalized by (CST): Colt Pearce MD on 12/13/2024 at 9:04 PM                 Medical Decision Making  Patient who appears to be here with 2 separate complaints.  Her doctor was most concerned because she told him about waking up this morning with 30 minutes of chest tightness.  It went away and has not come back since then.  That was over 15 hours ago.  She does  not really get exertional chest pain.  No nausea or diaphoresis.  She does also complain of right lateral knee pain that started yesterday.  She is able to ambulate but there is some tenderness in some areas.  No focal neurologic deficit here on exam.  Differential of her chest tightness could be GI, ACS, dissection, PE, pneumothorax, pneumomediastinum and many other possibilities.  Clinically she looks very comfortable has no ST elevation on EKG.  Pulse ox normal 99% on room air and heart rate was normal.    Amount and/or Complexity of Data Reviewed  External Data Reviewed: notes.     Details: 1/22 NUC stress: Impression  CONCLUSION: Normal exercise myocardial perfusion study with normal wall motion and left ventricular ejection fraction.         Labs: ordered. Decision-making details documented in ED Course.  Radiology: ordered and independent interpretation performed. Decision-making details documented in ED Course.  ECG/medicine tests: ordered and independent interpretation performed. Decision-making details documented in ED Course.        Disposition and Plan     Clinical Impression:  1. Chest pain of uncertain etiology         Disposition:  Admit  12/13/2024 10:14 pm    Follow-up:  No follow-up provider specified.  We recommend that you schedule follow up care with a primary care provider within the next three months to obtain basic health screening including reassessment of your blood pressure.      Medications Prescribed:  Current Discharge Medication List              Supplementary Documentation:

## 2024-12-14 NOTE — PROGRESS NOTES
Mara Davis Patient Status:  Observation    1955 MRN [id number]   Location [facility] 3W/SW Attending Massiel Kc MD   Hosp Day # 0 PCP SARBJIT HERNANDEZ MD     Cardiology Nocturnal APN Note    Briefly: (Documentation from chart review)     Mara Davis is a 68 y/o female who presented with CP and has a PMH/PSH of:       Past Medical History:    S/P lobectomy of brain    Seizure disorder (HCC)       Primary Cardiologist None    Vital Signs:       2024    11:23 PM 2024    11:38 PM   Vitals History   /66    BP Location Right arm    Pulse 54    Resp 18    SpO2 98 %    Weight  95 lbs   BMI  19.19 kg/m2        Labs:   Lab Results   Component Value Date    WBC 5.8 2024    HGB 13.5 2024    HCT 40.2 2024    .0 2024    CREATSERUM 0.68 2024    BUN 8 2024     2024    K 3.5 2024     2024    CO2 26.0 2024    GLU 94 2024    CA 9.1 2024    ALB 4.0 2024    ALKPHO 90 2024    BILT 0.2 2024    TP 6.3 2024    AST 17 2024    ALT 16 2024    DDIMER 0.29 2024    TROPHS 8 2024       Diagnostics:   XR CHEST AP PORTABLE  (CPT=71045)    Result Date: 2024  PROCEDURE: XR CHEST AP PORTABLE  (CPT=71045) TIME: .   COMPARISON: [facility], XR CHEST AP PORTABLE (CPT=71045), 2022, 8:16 PM.  [facility], XR CHEST AP PORTABLE (CPT=71045), 2022, 4:07 PM.  [facility], X CHEST PORTABLE, 10/25/2010, 6:25 PM.  [facility], X CHEST PORTABLE, 3/21/2010, 12:24 PM.  INDICATIONS: Possible blood clot; chest heaviness.  TECHNIQUE:   Single view.   FINDINGS:  CARDIAC/VASC: The cardiomediastinal silhouette is not enlarged. There is mild tortuosity of the thoracic aorta with peripheral atherosclerotic vascular calcification. The pulmonary vascularity is within normal limits. MEDIAST/SYLVAIN: No  visible mass or adenopathy. LUNGS/PLEURA: The lungs are hyperexpanded. There are relative lucencies of the upper lobes bilaterally, and coarsened bronchovascular markings are apparent. Suture material in the left lung is evident. No airspace consolidation, pleural effusion, or pneumothorax is evident.  BONES: Mild scoliosis and multilevel degenerative changes of the thoracic spine are apparent. There are slight degenerative changes of the shoulders. OTHER: Leads overlie the chest and obscure underlying detail.           CONCLUSION:  Stable chest demonstrating hyperexpanded lungs and postoperative changes of the left lung without radiographically evident acute intrathoracic process.    Dictated by (CST): Colt Pearce MD on 12/13/2024 at 9:03 PM     Finalized by (CST): Colt Pearce MD on 12/13/2024 at 9:04 PM            Allergies:  Allergies[1]    Medications:    topiramate    clonazePAM    atorvastatin    lacosamide    clopidogrel    lacosamide    topiramate    ondansetron    acetaminophen    heparin    hydrALAzine    HYDROcodone-acetaminophen    alum-mag hydroxide-simethicone    zolpidem    famotidine    Assessment:   Chest pain  - Troponin 6>8  - No acute ECG changes  - Trend troponin  - Repeat ECG for any significant rise in troponin      Plan:    - Continue to monitor overnight  - Formal Cardiology consult to follow in AM.       KAY Tucker  [facility]  12/14/2024  2:32 AM         [1]   Allergies  Allergen Reactions    Toradol [Ketorolac Tromethamine] HIVES    Valacyclovir SWELLING    Amoxicillin OTHER (SEE COMMENTS), NAUSEA ONLY and UNKNOWN    Flagyl [Metronidazole] NAUSEA AND VOMITING     Severe vomiting per patient    Benadryl [Diphenhydramine] UNKNOWN    Ceftriaxone UNKNOWN    Depakote [Valproic Acid] UNKNOWN    Dilantin [Phenytoin] UNKNOWN    Flexeril [Cyclobenzaprine] UNKNOWN    Keppra [Levetiracetam] UNKNOWN    Lamictal [Lamotrigine] UNKNOWN    Other OTHER (SEE COMMENTS)      Septocaine--patient had seizures with this medication    Prednisone OTHER (SEE COMMENTS)     Cerner Allergy Text Annotation: predniSONE, Cerner Reaction: states she had a reaction but unsure what it was    Prilosec [Omeprazole] UNKNOWN    Tegretol [Carbamazepine] UNKNOWN    Tramadol UNKNOWN    Trileptal [Oxcarbazepine] UNKNOWN    Amoxicillin-Pot Clavulanate RASH    Penicillins RASH    Sulfa Antibiotics UNKNOWN and OTHER (SEE COMMENTS)     Cerner Allergy Text Annotation: sulfa drugs, Cerner Reaction: nausea

## 2024-12-14 NOTE — PLAN OF CARE
Problem: Patient Centered Care  Goal: Patient preferences are identified and integrated in the patient's plan of care  Description: Interventions:  - What would you like us to know as we care for you? From home with dog  - Provide timely, complete, and accurate information to patient/family  - Incorporate patient and family knowledge, values, beliefs, and cultural backgrounds into the planning and delivery of care  - Encourage patient/family to participate in care and decision-making at the level they choose  - Honor patient and family perspectives and choices  Outcome: Progressing     Problem: Patient/Family Goals  Goal: Patient/Family Long Term Goal  Description: Patient's Long Term Goal: \"go back home to my dog\"    Interventions:  - Cards consult  Monitor labs  Monitor vitals  - See additional Care Plan goals for specific interventions  Outcome: Progressing  Goal: Patient/Family Short Term Goal  Description: Patient's Short Term Goal: decrease chest pain/pressure    Interventions:   - monitor labs  Monitor vitals  Tele monitoring  Cards to see  - See additional Care Plan goals for specific interventions  Outcome: Progressing
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
## 2024-12-14 NOTE — PLAN OF CARE
Problem: Patient Centered Care  Goal: Patient preferences are identified and integrated in the patient's plan of care  Description: Interventions:  - What would you like us to know as we care for you? From home with dog  - Provide timely, complete, and accurate information to patient/family  - Incorporate patient and family knowledge, values, beliefs, and cultural backgrounds into the planning and delivery of care  - Encourage patient/family to participate in care and decision-making at the level they choose  - Honor patient and family perspectives and choices  Outcome: Adequate for Discharge     Problem: Patient/Family Goals  Goal: Patient/Family Long Term Goal  Description: Patient's Long Term Goal: \"go back home to my dog\"    Interventions:  - Cards consult  Monitor labs  Monitor vitals  - See additional Care Plan goals for specific interventions  Outcome: Adequate for Discharge  Goal: Patient/Family Short Term Goal  Description: Patient's Short Term Goal: decrease chest pain/pressure    Interventions:   - monitor labs  Monitor vitals  Tele monitoring  Cards to see  - See additional Care Plan goals for specific interventions  Outcome: Adequate for Discharge     Problem: CARDIOVASCULAR - ADULT  Goal: Maintains optimal cardiac output and hemodynamic stability  Description: INTERVENTIONS:  - Monitor vital signs, rhythm, and trends  - Monitor for bleeding, hypotension and signs of decreased cardiac output  - Evaluate effectiveness of vasoactive medications to optimize hemodynamic stability  - Monitor arterial and/or venous puncture sites for bleeding and/or hematoma  - Assess quality of pulses, skin color and temperature  - Assess for signs of decreased coronary artery perfusion - ex. Angina  - Evaluate fluid balance, assess for edema, trend weights  Outcome: Adequate for Discharge  Goal: Absence of cardiac arrhythmias or at baseline  Description: INTERVENTIONS:  - Continuous cardiac monitoring, monitor vital  signs, obtain 12 lead EKG if indicated  - Evaluate effectiveness of antiarrhythmic and heart rate control medications as ordered  - Initiate emergency measures for life threatening arrhythmias  - Monitor electrolytes and administer replacement therapy as ordered  Outcome: Adequate for Discharge     Problem: SKIN/TISSUE INTEGRITY - ADULT  Goal: Skin integrity remains intact  Description: INTERVENTIONS  - Assess and document risk factors for pressure ulcer development  - Assess and document skin integrity  - Monitor for areas of redness and/or skin breakdown  - Initiate interventions, skin care algorithm/standards of care as needed  Outcome: Adequate for Discharge     Problem: HEMATOLOGIC - ADULT  Goal: Free from bleeding injury  Description: (Example usage: patient with low platelets)  INTERVENTIONS:  - Avoid intramuscular injections, enemas and rectal medication administration  - Ensure safe mobilization of patient  - Hold pressure on venipuncture sites to achieve adequate hemostasis  - Assess for signs and symptoms of internal bleeding  - Monitor lab trends  - Patient is to report abnormal signs of bleeding to staff  - Avoid use of toothpicks and dental floss  - Use electric shaver for shaving  - Use soft bristle tooth brush  - Limit straining and forceful nose blowing  Outcome: Adequate for Discharge     Problem: PAIN - ADULT  Goal: Verbalizes/displays adequate comfort level or patient's stated pain goal  Description: INTERVENTIONS:  - Encourage pt to monitor pain and request assistance  - Assess pain using appropriate pain scale  - Administer analgesics based on type and severity of pain and evaluate response  - Implement non-pharmacological measures as appropriate and evaluate response  - Consider cultural and social influences on pain and pain management  - Manage/alleviate anxiety  - Utilize distraction and/or relaxation techniques  - Monitor for opioid side effects  - Notify MD/LIP if interventions  unsuccessful or patient reports new pain  - Anticipate increased pain with activity and pre-medicate as appropriate  Outcome: Adequate for Discharge     Problem: SAFETY ADULT - FALL  Goal: Free from fall injury  Description: INTERVENTIONS:  - Assess pt frequently for physical needs  - Identify cognitive and physical deficits and behaviors that affect risk of falls.  - [place] fall precautions as indicated by assessment.  - Educate pt/family on patient safety including physical limitations  - Instruct pt to call for assistance with activity based on assessment  - Modify environment to reduce risk of injury  - Provide assistive devices as appropriate  - Consider OT/PT consult to assist with strengthening/mobility  - Encourage toileting schedule  Outcome: Adequate for Discharge     Problem: DISCHARGE PLANNING  Goal: Discharge to home or other facility with appropriate resources  Description: INTERVENTIONS:  - Identify barriers to discharge w/pt and caregiver  - Include patient/family/discharge partner in discharge planning  - Arrange for needed discharge resources and transportation as appropriate  - Identify discharge learning needs (meds, wound care, etc)  - Arrange for interpreters to assist at discharge as needed  - Consider post-discharge preferences of patient/family/discharge partner  - Complete POLST form as appropriate  - Assess patient's ability to be responsible for managing their own health  - Refer to Case Management Department for coordinating discharge planning if the patient needs post-hospital services based on physician/LIP order or complex needs related to functional status, cognitive ability or social support system  Outcome: Adequate for Discharge    Patient completed CTA this morning. Patient medically cleared for discharge.

## 2024-12-14 NOTE — DISCHARGE SUMMARY
[facility]  part of [facility]    Discharge Summary    Mara Davis Patient Status:  Observation    1955 MRN [id number]   Location [facility] 3W/SW Attending Priscilla Prescott MD   Hosp Day # 0 PCP SARBJIT HERNANDEZ MD     Date of Admission: 2024 Disposition: Home or Self Care     Date of Discharge: 24      Admitting Diagnosis: Chest pain of uncertain etiology [R07.9]    Hospital Discharge Diagnoses:  chest pain    Lace+ Score: 40  59-90 High Risk  29-58 Medium Risk  0-28   Low Risk.    TCM Follow-Up Recommendation:  LACE 29-58: Moderate Risk of readmission after discharge from the hospital.      Problem List:   Patient Active Problem List   Diagnosis    Chest pain with moderate risk for cardiac etiology    Hypokalemia    Chest pain of uncertain etiology       Reason for Admission: chest pain    Physical Exam:   General appearance: alert, appears stated age and cooperative, cachetic, + temporal wasting  Pulmonary:  clear to auscultation bilaterally  Cardiovascular: S1, S2 normal, no murmur, click, rub or gallop, regular rate and rhythm  Abdominal: soft, non-tender; bowel sounds normal; no masses,  no organomegaly  Extremities: extremities normal, atraumatic, no cyanosis or edema  Psychiatric: calm      History of Present Illness:   Per Dr. Rupa Terry Love Davis is a(n) 69 year old female, with a past medical history significant for seizure disorder currently on antiepileptics presents with a complaint of sudden onset chest pain in the middle of the night waking her up from sleep.  Describes pain as a tightness across her chest associated with difficulty breathing.  Claims pain lasted almost half an hour before spontaneously subsiding.  She denies any associated shortness of breath nausea vomiting or diaphoresis denies any palpitations or radiation of the pain.  Has been noticing right lower extremity pain throughout the leg which is now resolved       Hospital  Course:   Chest Pain  -[facility] cardi[facility] eval, EKG, troponin and coranary angiogram negative. D/w patient, pain resolved    Severe Protein Calorie malnutrition  -with temporal wasting  -states she is a vegetarian and has very little hunger or po intake. States she hadn't eaten in over a day prior to admission  -d/w patient, recomment follow-up with pcp.     Consultations: cardiology    Procedures: n/a    Complications: n/a    Discharge Condition: Good    Discharge Medications:      Discharge Medications        CONTINUE taking these medications        Instructions Prescription details   atorvastatin 80 MG Tabs  Commonly known as: Lipitor      Take 1 tablet every day by oral route at bedtime.   Refills: 0     clonazePAM 0.5 MG Tabs  Commonly known as: KlonoPIN      Take 1 tablet (0.5 mg total) by mouth nightly. At bedtime for seizures   Refills: 0     clopidogrel 75 MG Tabs  Commonly known as: Plavix      Take 1 tablet (75 mg total) by mouth daily.   Refills: 0     lacosamide 100 MG Tabs  Commonly known as: Vimpat      Take 2 tablets (200 mg total) by mouth every evening.   Refills: 0     lacosamide 100 MG Tabs  Commonly known as: Vimpat      Take 1 tablet (100 mg total) by mouth every morning.   Refills: 0     topiramate 200 MG Tabs  Commonly known as: TopaMAX      Take 1 tablet (200 mg total) by mouth every morning. 200mg in the morning and 400mg at night   Refills: 0     topiramate 200 MG Tabs  Commonly known as: TopaMAX      Take 2 tablets (400 mg total) by mouth every evening.   Refills: 0              Follow up Visits: Follow-up with pcp in 1 week    Follow up Labs: n/a     Other Discharge Instructions: follow-up with pcp    MANDY BARRIGA MD  12/14/2024  2:19 PM    > 35 min

## 2024-12-14 NOTE — H&P
[facility]  part of [facility]    History & Physical    Mara Davis Patient Status:  Emergency    1955 MRN [id number]   Location [facility] EMERGENCY DEPARTMENT Attending Oneil Clay MD   Hosp Day # 0 PCP SARBJIT HERNANDEZ MD     Date:  2024  Date of Admission:  2024    Chief Complaint:  Chief Complaint   Patient presents with    Deep Vein Thrombosis   Chest pain    History of Present Illness:  Mara Davis is a(n) 69 year old female, with a past medical history significant for seizure disorder currently on antiepileptics presents with a complaint of sudden onset chest pain in the middle of the night waking her up from sleep.  Describes pain as a tightness across her chest associated with difficulty breathing.  Claims pain lasted almost half an hour before spontaneously subsiding.  She denies any associated shortness of breath nausea vomiting or diaphoresis denies any palpitations or radiation of the pain.  Has been noticing right lower extremity pain throughout the leg which is now resolved    History:  Past Medical History:    S/P lobectomy of brain    Seizure disorder (HCC)     Past Surgical History:   Procedure Laterality Date    Hysterectomy       Family History   Problem Relation Age of Onset    Ovarian Cancer Mother 65    Breast Cancer Mother 84    Breast Cancer Maternal Aunt         UNK       reports that she has never smoked. She has never used smokeless tobacco. She reports that she does not drink alcohol and does not use drugs.    Allergies:  Allergies[1]    Home Medications:  Prior to Admission Medications   Prescriptions Last Dose Informant Patient Reported? Taking?   atorvastatin 80 MG Oral Tab 2024 at  8:30 PM  Yes Yes   Sig: Take 1 tablet every day by oral route at bedtime.   clonazePAM 0.5 MG Oral Tab 2024 at  8:30 PM  Yes Yes   Sig: Take 1 tablet (0.5 mg total) by mouth nightly. At bedtime for seizures   clopidogrel 75 MG  Oral Tab 12/13/2024 at  6:00 AM  Yes Yes   Sig: Take 1 tablet (75 mg total) by mouth daily.   lacosamide 100 MG Oral Tab 12/13/2024 at  6:00 AM  Yes Yes   Sig: Take 1 tablet (100 mg total) by mouth every morning.   lacosamide 100 MG Oral Tab 12/13/2024 at  6:00 PM  Yes Yes   Sig: Take 2 tablets (200 mg total) by mouth every evening.   topiramate 200 MG Oral Tab 12/13/2024 at  6:00 AM  Yes Yes   Sig: Take 1 tablet (200 mg total) by mouth every morning. 200mg in the morning and 400mg at night   topiramate 200 MG Oral Tab 12/13/2024 at  6:00 PM  Yes Yes   Sig: Take 2 tablets (400 mg total) by mouth every evening.      Facility-Administered Medications: None       Review of Systems:  Constitutional:  Weakness, Fatigue.  Eye:  Negative.  Ear/Nose/Mouth/Throat:  Negative.  Respiratory:  Negative  Cardiovascular: Chest pain  Gastrointestinal:  Negative.  Genitourinary:  Negative  Endocrine:  Negative.  Immunologic:  Negative.  Musculoskeletal:  Negative.  Integumentary:  Negative.  Neurologic:  Negative.  Psychiatric:  Negative.  ROS reviewed as documented in chart    Physical Exam:  Temp:  [98.9 °F (37.2 °C)] 98.9 °F (37.2 °C)  Pulse:  [52-72] 52  Resp:  [18-20] 18  BP: (125-152)/(56-77) 152/75  SpO2:  [99 %-100 %] 100 %    General:  Alert and oriented.  Diffuse skin problem:  None.  Eye:  Pupils are equal, round and reactive to light, extraocular movements are intact, Normal conjunctiva.  HENT:  Normocephalic, oral mucosa is moist.  Head:  Normocephalic, atraumatic.  Neck:  Supple, non-tender, no carotid bruit, no jugular venous distention, no lymphadenopathy, no thyromegaly.  Respiratory:  Lungs are clear to auscultation, respirations are non-labored, breath sounds are equal, symmetrical chest wall expansion.  Cardiovascular:  Normal rate, regular rhythm, no murmur, no edema.  Gastrointestinal:  Soft, non-tender, non-distended, normal bowel sounds, no organomegaly.  Lymphatics:  No lymphadenopathy neck, axilla,  groin.  Musculoskeletal: Normal range of motion.  normal strength.  Feet:  Normal pulses.  Neurologic:  Alert, oriented, no focal deficits, cranial nerves II-XII are grossly intact.  Cognition and Speech:  Oriented, speech clear and coherent.  Psychiatric:  Cooperative, appropriate mood & affect.      Laboratory Data:   Lab Results   Component Value Date    WBC 5.8 12/13/2024    HGB 13.5 12/13/2024    HCT 40.2 12/13/2024    .0 12/13/2024    CREATSERUM 0.68 12/13/2024    BUN 8 12/13/2024     12/13/2024    K 3.5 12/13/2024     12/13/2024    CO2 26.0 12/13/2024    GLU 94 12/13/2024    CA 9.1 12/13/2024    ALB 4.0 12/13/2024    ALKPHO 90 12/13/2024    BILT 0.2 12/13/2024    TP 6.3 12/13/2024    AST 17 12/13/2024    ALT 16 12/13/2024    DDIMER 0.29 12/13/2024       Imaging:  XR CHEST AP PORTABLE  (CPT=71045)    Result Date: 12/13/2024  CONCLUSION:  Stable chest demonstrating hyperexpanded lungs and postoperative changes of the left lung without radiographically evident acute intrathoracic process.    Dictated by (CST): Colt Pearce MD on 12/13/2024 at 9:03 PM     Finalized by (CST): Colt Pearce MD on 12/13/2024 at 9:04 PM            Assessment and Plan:    Chest pain possible ACS  Initial troponins negative, EKG with no acute changes, will get stress test in a.m. to rule out angina.    Seizure disorder  Maintain seizure precautions, continue home meds.    Possible GERD  Start patient on Pepcid.    Prophylaxis  Subcutaneous heparin    CODE STATUS  Full    Primary care physician  SARBJIT HERNANDEZ MD    60 minutes spent on this admission - examining patient, obtaining history, reviewing previous medical records, going over test results/imaging and discussing plan of care. All questions answered.     Disposition  Clinical course will dictate outcome      JACKSON DAWN MD  12/13/2024  10:52 PM         [1]   Allergies  Allergen Reactions    Toradol [Ketorolac Tromethamine] HIVES    Valacyclovir  SWELLING    Amoxicillin OTHER (SEE COMMENTS), NAUSEA ONLY and UNKNOWN    Flagyl [Metronidazole] NAUSEA AND VOMITING     Severe vomiting per patient    Benadryl [Diphenhydramine] UNKNOWN    Ceftriaxone UNKNOWN    Depakote [Valproic Acid] UNKNOWN    Dilantin [Phenytoin] UNKNOWN    Flexeril [Cyclobenzaprine] UNKNOWN    Keppra [Levetiracetam] UNKNOWN    Lamictal [Lamotrigine] UNKNOWN    Other OTHER (SEE COMMENTS)     Septocaine--patient had seizures with this medication    Prednisone OTHER (SEE COMMENTS)     Cerner Allergy Text Annotation: predniSONE, Cerner Reaction: states she had a reaction but unsure what it was    Prilosec [Omeprazole] UNKNOWN    Tegretol [Carbamazepine] UNKNOWN    Tramadol UNKNOWN    Trileptal [Oxcarbazepine] UNKNOWN    Amoxicillin-Pot Clavulanate RASH    Penicillins RASH    Sulfa Antibiotics UNKNOWN and OTHER (SEE COMMENTS)     Cerner Allergy Text Annotation: sulfa drugs, Cerner Reaction: nausea

## 2024-12-14 NOTE — PROGRESS NOTES
Patient was provided with discharge instructions, education, and follow up information. No new prescriptions. Patient verbalizes understanding of follow up information. Patient has no questions after reviewing all instructions and will be going home. Patient transported to exit in wheelchair by RN. Belongings taken with patient.

## 2024-12-14 NOTE — IMAGING NOTE
HX TAKEN: CHEST PAIN    PT CONSENTED: ON FLOOR WITH INPT RN.     BMI 18.86    CTA ORDERED BY DR ROSAS.     WAS PT GIVEN CTA PREMEDS:  NO    18 GAUGE IV STARTED ON FLOOR WITH INPT RN.    LABS:  GFR = 94   CREATINE = 0.68    TO CT TABLE @ 1144    CONNECT TO MONITOR  HR 62 /54      NITROGLYCERIN 0.4 MILLIGRAMS SUBLINGUAL GIVEN AT 1146     CALCIUM SCORE COMPLETED AT 1151     1155: HR 77 /53  METOPROLOL 5 MILLIGRAMS GIVEN IV PUSH  SEE  PROTOCOL    INJECTION STARTED AT 1200 HR 54 DURING SCAN PROCEDURE COMPLETE    POST SCAN HR 54 /54 AT 1205    1209: PT RETURNS TO INPT ROOM ON BED BY TRANSPORTER.   REPORT GIVEN TO INPT RN.

## 2024-12-17 NOTE — PAYOR COMM NOTE
--------------  DISCHARGE REVIEW    Payor: JAYLEEN LANG  Subscriber #:  [id number]  Authorization Number: N/A    Admit date: N/A  Admit time:  N/A  Discharge Date: 2024  3:17 PM     Admitting Physician: Massiel Kc MD  Attending Physician:  No att. providers found  Primary Care Physician: Jose Armando Mcknight MD       [facility]  part of [facility]    Discharge Summary    Mara Davis Patient Status:  Observation    1955 MRN [id number]   Location [facility] 3W/SW Attending Priscilla Prescott MD   Hosp Day # 0 PCP JOSE ARMANDO MCKNIGHT MD     Date of Admission: 2024 Disposition: Home or Self Care     Date of Discharge: 24      Admitting Diagnosis: Chest pain of uncertain etiology [R07.9]    Hospital Discharge Diagnoses:  chest pain    Lace+ Score: 40  59-90 High Risk  29-58 Medium Risk  0-28   Low Risk.    TCM Follow-Up Recommendation:  LACE 29-58: Moderate Risk of readmission after discharge from the hospital.      Problem List:   Patient Active Problem List   Diagnosis    Chest pain with moderate risk for cardiac etiology    Hypokalemia    Chest pain of uncertain etiology       Reason for Admission: chest pain    Physical Exam:   General appearance: alert, appears stated age and cooperative, cachetic, + temporal wasting  Pulmonary:  clear to auscultation bilaterally  Cardiovascular: S1, S2 normal, no murmur, click, rub or gallop, regular rate and rhythm  Abdominal: soft, non-tender; bowel sounds normal; no masses,  no organomegaly  Extremities: extremities normal, atraumatic, no cyanosis or edema  Psychiatric: calm      History of Present Illness:   Per Dr. Rupa Terry Love Davis is a(n) 69 year old female, with a past medical history significant for seizure disorder currently on antiepileptics presents with a complaint of sudden onset chest pain in the middle of the night waking her up from sleep.  Describes pain as a tightness across her chest associated with  difficulty breathing.  Claims pain lasted almost half an hour before spontaneously subsiding.  She denies any associated shortness of breath nausea vomiting or diaphoresis denies any palpitations or radiation of the pain.  Has been noticing right lower extremity pain throughout the leg which is now resolved       Hospital Course:   Chest Pain  -[facility] cardiology eval, EKG, troponin and coranary angiogram negative. D/w patient, pain resolved    Severe Protein Calorie malnutrition  -with temporal wasting  -states she is a vegetarian and has very little hunger or po intake. States she hadn't eaten in over a day prior to admission  -d/w patient, recomment follow-up with pcp.     Consultations: cardiology    Procedures: n/a    Complications: n/a    Discharge Condition: Good    Discharge Medications:      Discharge Medications        CONTINUE taking these medications        Instructions Prescription details   atorvastatin 80 MG Tabs  Commonly known as: Lipitor      Take 1 tablet every day by oral route at bedtime.   Refills: 0     clonazePAM 0.5 MG Tabs  Commonly known as: KlonoPIN      Take 1 tablet (0.5 mg total) by mouth nightly. At bedtime for seizures   Refills: 0     clopidogrel 75 MG Tabs  Commonly known as: Plavix      Take 1 tablet (75 mg total) by mouth daily.   Refills: 0     lacosamide 100 MG Tabs  Commonly known as: Vimpat      Take 2 tablets (200 mg total) by mouth every evening.   Refills: 0     lacosamide 100 MG Tabs  Commonly known as: Vimpat      Take 1 tablet (100 mg total) by mouth every morning.   Refills: 0     topiramate 200 MG Tabs  Commonly known as: TopaMAX      Take 1 tablet (200 mg total) by mouth every morning. 200mg in the morning and 400mg at night   Refills: 0     topiramate 200 MG Tabs  Commonly known as: TopaMAX      Take 2 tablets (400 mg total) by mouth every evening.   Refills: 0              Follow up Visits: Follow-up with pcp in 1 week    Follow up Labs: n/a     Other Discharge  Instructions: follow-up with pcp    MANDY BARRIGA MD  12/14/2024  2:19 PM    > 35 min       Electronically signed by Mandy Barriga MD on 12/14/2024  6:55 PM         REVIEWER COMMENTS

## 2025-03-04 NOTE — ED INITIAL ASSESSMENT (HPI)
Pt arrives through triage with       complaints of balance issues since Friday in the am when she wakes up and then it goes away. Dr alva advised pt to come to the ED for lab workup.    Pt denies dizziness currently, ha, fatigue.    Stopped taking plavix a month ago      Hx of seizures     Initial (On Arrival)

## 2025-03-04 NOTE — ED PROVIDER NOTES
Patient Seen in: [facility] Emergency Department    History   No chief complaint on file.      HPI    Patient presents to the ED complaining of intermittent dizziness for the past 2 days.  She states dizziness appears to be worse right when she gets up in the morning and then goes away.  She denies any current dizziness and states that she has no coordination problems.  Advised by her cardiologist to get laboratory testing done.    History reviewed.   Past Medical History:    S/P lobectomy of brain    Seizure disorder (HCC)       History reviewed.   Past Surgical History:   Procedure Laterality Date    Hysterectomy           Medications :  Prescriptions Prior to Admission[1]     Family History   Problem Relation Age of Onset    Ovarian Cancer Mother 65    Breast Cancer Mother 84    Breast Cancer Maternal Aunt         UNK     Prostate Cancer Maternal Uncle     Pancreatic Cancer Neg        Smoking Status:   Social History     Socioeconomic History    Marital status:    Tobacco Use    Smoking status: Never    Smokeless tobacco: Never   Vaping Use    Vaping status: Never Used   Substance and Sexual Activity    Alcohol use: Never    Drug use: Never       Constitutional and vital signs reviewed.      Social History and Family History elements reviewed from today, pertinent positives to the presenting problem noted.    Physical Exam     ED Triage Vitals [03/03/25 1813]   /71   Pulse 73   Resp 18   Temp 97.3 °F (36.3 °C)   Temp src Temporal   SpO2 96 %   O2 Device None (Room air)       All measures to prevent infection transmission during my interaction with the patient were taken. Handwashing was performed prior to and after the exam.  Stethoscope and any equipment used during my examination was cleaned with super sani-cloth germicidal wipes following the exam.     Physical Exam  Vitals and nursing note reviewed.   Constitutional:       General: She is not in acute distress.     Appearance: She is  well-developed. She is not ill-appearing or toxic-appearing.   HENT:      Head: Normocephalic and atraumatic.   Eyes:      General:         Right eye: No discharge.         Left eye: No discharge.      Extraocular Movements: Extraocular movements intact.      Conjunctiva/sclera: Conjunctivae normal.      Pupils: Pupils are equal, round, and reactive to light.   Neck:      Trachea: No tracheal deviation.   Cardiovascular:      Rate and Rhythm: Normal rate.   Pulmonary:      Effort: Pulmonary effort is normal. No respiratory distress.   Musculoskeletal:         General: No deformity.   Skin:     General: Skin is warm and dry.   Neurological:      General: No focal deficit present.      Mental Status: She is alert and oriented to person, place, and time. Mental status is at baseline.      Cranial Nerves: No cranial nerve deficit.      Sensory: No sensory deficit.      Motor: No weakness.      Coordination: Coordination normal.      Gait: Gait normal.   Psychiatric:         Mood and Affect: Mood normal.         Behavior: Behavior normal.         ED Course        Labs Reviewed   BASIC METABOLIC PANEL (8) - Abnormal; Notable for the following components:       Result Value    BUN 8 (*)     All other components within normal limits   TROPONIN I HIGH SENSITIVITY - Normal   CBC WITH DIFFERENTIAL WITH PLATELET       As Interpreted by me    Imaging Results Available and Reviewed while in ED: No results found.  ED Medications Administered: Medications - No data to display      MDM     Vitals:    03/03/25 1813   BP: 123/71   Pulse: 73   Resp: 18   Temp: 97.3 °F (36.3 °C)   TempSrc: Temporal   SpO2: 96%   Weight: 43.5 kg   Height: 154.9 cm (5' 1\")     *I personally reviewed and interpreted all ED vitals.    Pulse Ox: 96%, Room air, Normal       Differential Diagnosis/ Diagnostic Considerations: Anemia, dehydration, vertigo, other    Complicating Factors: The patient already has does not have any pertinent problems on file. to  contribute to the complexity of this ED evaluation.    Medical Decision Making  Patient presents to the ED with intermittent dizziness for the past 4 days.  Nondistressed on examination.  Neurologically intact and laboratory testing without concerning findings.  Patient reassured and stable for discharge with outpatient follow-up.    Problems Addressed:  Lightheadedness: acute illness or injury    Amount and/or Complexity of Data Reviewed  Labs: ordered. Decision-making details documented in ED Course.        Condition upon leaving the department: Stable    Disposition and Plan     Clinical Impression:  1. Lightheadedness        Disposition:  Discharge    Follow-up:  Jose Armando Mcknight MD  [address] [facility]  [address] [place] [id number]  [phone]    Schedule an appointment as soon as possible for a visit in 3 day(s)        Medications Prescribed:  Discharge Medication List as of 3/3/2025  9:24 PM                           [1] (Not in a hospital admission)

## (undated) NOTE — ED AVS SNAPSHOT
Ms. See Bolden   MRN: [id number]    Department:  [facility] Emergency Department   Date of Visit:  9/25/2019           Disclosure     Insurance plans vary and the physician(s) referred by the ER may not be covered by your plan.  Please con within the next three months to obtain basic health screening including reassessment of your blood pressure.     IF THERE IS ANY CHANGE OR WORSENING OF YOUR CONDITION, CALL YOUR PRIMARY CARE PHYSICIAN AT ONCE OR RETURN IMMEDIATELY TO THE EMERGENCY DEPARTMEN

## (undated) NOTE — ED AVS SNAPSHOT
Ms. Adrianne Crow   MRN: [id number]    Department:  [facility] Emergency Department   Date of Visit:  9/1/2019           Disclosure     Insurance plans vary and the physician(s) referred by the ER may not be covered by your plan.  Please cont within the next three months to obtain basic health screening including reassessment of your blood pressure.     IF THERE IS ANY CHANGE OR WORSENING OF YOUR CONDITION, CALL YOUR PRIMARY CARE PHYSICIAN AT ONCE OR RETURN IMMEDIATELY TO THE EMERGENCY DEPARTMEN

## (undated) NOTE — ED AVS SNAPSHOT
Radha Portillo   MRN: [id number]    Department:  [facility] Emergency Department   Date of Visit:  3/20/2019           Disclosure     Insurance plans vary and the physician(s) referred by the ER may not be covered by your plan.  Please contact within the next three months to obtain basic health screening including reassessment of your blood pressure.     IF THERE IS ANY CHANGE OR WORSENING OF YOUR CONDITION, CALL YOUR PRIMARY CARE PHYSICIAN AT ONCE OR RETURN IMMEDIATELY TO THE EMERGENCY DEPARTMEN

## (undated) NOTE — LETTER
[place] [address]    Consent for Diagnostic Services    Your physician has ordered one of the following tests to determine the function of your heart: Regadenoson Sestamibi. The information obtained will aid your physician in detecting the possible presence of heart disease, to determine why you may have such symptoms such as chest pain or shortness of breath and to determine an appropriate plan of medical management.    The risks associated with any exercise test or pharmacological stress test are similar to the risks associated with exercise, including an abnormal blood pressure, dizziness, fainting, abnormal heart rate and in very rear instances heart attach, stroke, or death. During the test you must report any unusual feeling or symptoms you experience during the test. Every effort will be made to minimize such risks by careful observation during the test. Emergency equipment and trained personnel are available to deal with unusual situations, which may arise and these personnel have permission to treat you.     During the treadmill or nuclear stress test, the exercise intensity begins at a low level and advances in stages depending on your fitness level. We may stop the test at any time because of signs of fatigue or changes in your heart rate, electrocardiogram, or blood pressure, or other symptoms you may experience.     If your doctor has ordered a pharmacological stress test, you may experience a headache, shortness of breath, flushing, warmth, rapid heart rate, or a bitter taste in your mouth. Sometimes you may not experience any symptoms. Your prompt reporting of any symptoms is very important.     During a Regadenoson stress test, you are given an injection of Regadenoson. Immediately after the Regadenoson is given, you will be injected with a radioactive isotope. During a Dobutamine stress test, Dobutamine infuses over approximately fifteen minutes. A radioactive isotope is  injected during the infusion. After either pharmacological stress test, you will have either a nuclear scan or an echocardiogram.    The information that is obtained during your testing will be treated as privileged and confidential. The information obtained will be given to your doctor and may be used for insurance purposes or to track and trend data as part of  with your privacy retained.    I have read and understand the above information. I voluntarily agree and consent to the above ordered test. Furthermore, no guarantees or assurances have been given by anyone as to the results that may be obtained from this procedure. Any questions that may have occurred to me have been answered to my satisfaction.     Signature of Patient:   ____________________________________________________________    Signature of Witness: _______________________________Date: ___________Time: ________

## (undated) NOTE — LETTER
Consent for Coronary CT Angiography    Date of Procedure: 12/14/2024     on Mara Davis    Your doctor has recommended that you have a Coronary CT Angiography procedure. Coronary CT Angiography is a diagnostic procedure using computed tomography to scan the chest and coronary arteries. It is a non-invasive technique that allows clear visualization of narrowed and blocked arteries. Blockage in these arteries may lead to heart attack or stroke.    You will lie on a table that slides slowly into a large circular opening called the CT gantry. The procedure will be performed by the CT Staff, including a nurse, a technologist, and a patient assistant. The staff will be with you throughout the entire procedure to explain each step, make you as comfortable as possible, and answer all of your questions. The staff will take a series of images of your heart and chest to help define the area to be imaged. You will be asked to hold your breath for a short time as each series of images is performed and acquired.     You may receive medication called a beta blocker that will slow your hear rate down. This is needed so we can obtain better images of your heart. You may also receive a nitroglycerine spray under your tongue. This medication is given to dilate the arteries for better picture quality. The nitroglycerine may cause a drop in blood pressure. During the procedure you will receive an injection of a contrast material, which assists the physician in highlighting the anatomy of your heart. You may experience a warm sensation through your body and a metallic taste in your mouth. In rare circumstances, a rash and/or hives may occur. It's very important to inform your care giver of any changes you may feel or experience.     The medication and the contrast material used as part of your procedure are all deemed very safe, however there is always an element of risk to a patient when taking medication. Allergic  reactions to medication can range from very minor to very serious, leading to a life threatening situation or even death. Please be sure to communicate any allergy you may have to your caregiver immediately.    The information that is obtained during your testing will be treated as privileged and confidential. The information obtained will be given to your doctor and may be used for insurance purposes or to track and trend data as part of  with your privacy retained.     I, Mara Davis, have read and understand the above information. I voluntarily agree and consent to have the Coronary CT Angiography (CTA) Exam. Furthermore, no guarantees or assurances have been given by anyone as to the results that may be obtained from this procedure. Any questions that may have occurred to me have been answered to my satisfaction.    Signature of Patient: __________________________Date: ___________Time: _______      Patient Name: Mara Davis    : 1955      Printed: 2024      Medical Record #: [id number]

## (undated) NOTE — LETTER
Hospital Discharge Documentation    From: [facility] Hospitalist's Office  Phone: [phone]    Patient discharged time/date: 2024   Patient discharge disposition:  Home or Self Care       Discharge Summary - D/C Summary        Discharge Summary signed by Priscilla Prescott MD at 2024  6:55 PM  Version 1 of 1      Author: Priscilla Prescott MD Service: Hospitalist Author Type: Physician    Filed: 2024  6:55 PM Date of Service: 2024  2:19 PM Status: Signed    : Priscilla Prescott MD (Physician)         [facility]  part of [facility]    Discharge Summary    Mara Love Davis Patient Status:  Observation    1955 MRN [id number]   Location [facility] 3W/SW Attending Priscilla Prescott MD   Hosp Day # 0 PCP SARBJIT HERNANDEZ MD     Date of Admission: 2024 Disposition: Home or Self Care     Date of Discharge: 24      Admitting Diagnosis: Chest pain of uncertain etiology [R07.9]    Hospital Discharge Diagnoses:  chest pain    Lace+ Score: 40  59-90 High Risk  29-58 Medium Risk  0-28   Low Risk.    TCM Follow-Up Recommendation:  LACE 29-58: Moderate Risk of readmission after discharge from the hospital.      Problem List:   Patient Active Problem List   Diagnosis    Chest pain with moderate risk for cardiac etiology    Hypokalemia    Chest pain of uncertain etiology       Reason for Admission: chest pain    Physical Exam:   General appearance: alert, appears stated age and cooperative, cachetic, + temporal wasting  Pulmonary:  clear to auscultation bilaterally  Cardiovascular: S1, S2 normal, no murmur, click, rub or gallop, regular rate and rhythm  Abdominal: soft, non-tender; bowel sounds normal; no masses,  no organomegaly  Extremities: extremities normal, atraumatic, no cyanosis or edema  Psychiatric: calm      History of Present Illness:   Per Dr. Rupa Headleyey is a(n) 69 year old female, with a past medical history significant for  seizure disorder currently on antiepileptics presents with a complaint of sudden onset chest pain in the middle of the night waking her up from sleep.  Describes pain as a tightness across her chest associated with difficulty breathing.  Claims pain lasted almost half an hour before spontaneously subsiding.  She denies any associated shortness of breath nausea vomiting or diaphoresis denies any palpitations or radiation of the pain.  Has been noticing right lower extremity pain throughout the leg which is now resolved       Hospital Course:   Chest Pain  -[facility] eval, EKG, troponin and coranary angiogram negative. D/w patient, pain resolved    Severe Protein Calorie malnutrition  -with temporal wasting  -states she is a vegetarian and has very little hunger or po intake. States she hadn't eaten in over a day prior to admission  -d/w patient, recomment follow-up with pcp.     Consultations: cardiology    Procedures: n/a    Complications: n/a    Discharge Condition: Good    Discharge Medications:      Discharge Medications        CONTINUE taking these medications        Instructions Prescription details   atorvastatin 80 MG Tabs  Commonly known as: Lipitor      Take 1 tablet every day by oral route at bedtime.   Refills: 0     clonazePAM 0.5 MG Tabs  Commonly known as: KlonoPIN      Take 1 tablet (0.5 mg total) by mouth nightly. At bedtime for seizures   Refills: 0     clopidogrel 75 MG Tabs  Commonly known as: Plavix      Take 1 tablet (75 mg total) by mouth daily.   Refills: 0     lacosamide 100 MG Tabs  Commonly known as: Vimpat      Take 2 tablets (200 mg total) by mouth every evening.   Refills: 0     lacosamide 100 MG Tabs  Commonly known as: Vimpat      Take 1 tablet (100 mg total) by mouth every morning.   Refills: 0     topiramate 200 MG Tabs  Commonly known as: TopaMAX      Take 1 tablet (200 mg total) by mouth every morning. 200mg in the morning and 400mg at night   Refills: 0     topiramate 200 MG  Tabs  Commonly known as: TopaMAX      Take 2 tablets (400 mg total) by mouth every evening.   Refills: 0              Follow up Visits: Follow-up with pcp in 1 week    Follow up Labs: n/a     Other Discharge Instructions: follow-up with pcp    MANDY BARRIGA MD  12/14/2024  2:19 PM    > 35 min       Electronically signed by Mandy Barriga MD on 12/14/2024  6:55 PM